# Patient Record
(demographics unavailable — no encounter records)

---

## 2025-03-21 NOTE — HISTORY OF PRESENT ILLNESS
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  He had no significant disease in his RCA or circumflex arteries.  He returns today without significant chest pain.  His major complaint is continued GERD for which she has had multiple endoscopies and GI follow-up.  He has been compliant with his medications and has not had any bleeding issues.  His right radial access site is healing well without significant swelling, hematoma or pain.  3/21/2025: Denies anginal chest pains, SOB and LE edema.

## 2025-03-21 NOTE — DISCUSSION/SUMMARY
[Patient] : the patient [With Me] : with me [___ Month(s)] : in [unfilled] month(s) [FreeTextEntry1] : 65-year-old male with past medical history as above presenting for routine follow-up after PCI to his LAD for unstable angina.   1.  CAD - CCS class 0, continue ASA 81mg PO daily and switch to Plavix 75 mg p.o. daily.  2.  HLD - continue Lipitor 10 mg p.o. nightly.  Well-controlled.  He has had side effects with higher doses. 3.  HTN/PVC -continue toprol 12.5 mg PO daily  4.  GERD - on PPI (can refill for patient)  RTC 6 months

## 2025-03-21 NOTE — CARDIOLOGY SUMMARY
[de-identified] : 7/30/2021: Normal sinus rhythm 2/15/22: NSR, RSR', unchanged from prior 8/23/2022: Sinus bradycardia, RSR prime, unchanged from prior 9/23/2024: SR, PVCs [de-identified] : 1/19/2021: Ejection fraction 60 to 65%, mild MR, possible elevated pulmonary pressures [de-identified] : 9/22/2020: PCI to LAD x2 [de-identified] : 1/19/2021: Minimal nonobstructive disease bilaterally

## 2025-03-21 NOTE — REVIEW OF SYSTEMS
[Joint Pain] : joint pain [Negative] : Heme/Lymph [Heartburn] : heartburn [Muscle Cramps] : no muscle cramps

## 2025-03-21 NOTE — REASON FOR VISIT
[Hyperlipidemia] : hyperlipidemia [Coronary Artery Disease] : coronary artery disease [FreeTextEntry3] : Dr. Noel